# Patient Record
(demographics unavailable — no encounter records)

---

## 2025-05-21 NOTE — PHYSICAL EXAM
[Normal Coordination] : normal coordination [Normal Sensation] : normal sensation [Normal Mood and Affect] : normal mood and affect [Oriented] : oriented [Able to Communicate] : able to communicate [Well Developed] : well developed [Well Nourished] : well nourished [Left] : left shoulder [There are no fractures, subluxations or dislocations. No significant abnormalities are seen] : There are no fractures, subluxations or dislocations. No significant abnormalities are seen [Type 2 acromion] : Type 2 acromion [NL (0-180)] : full active abduction 0-180 degrees [NL (0-70)] : full internal rotation 0-70 degrees [NL (0-90)] : full external rotation 0-90 degrees [] : motor and sensory intact distally

## 2025-05-21 NOTE — HISTORY OF PRESENT ILLNESS
[de-identified] : Patient reports anterior shoulder pain with activity. She denies any injury but notes pain for quite a while. She occasionally has night pain and the worst pain with push-ups. She is RHD

## 2025-05-21 NOTE — DISCUSSION/SUMMARY
[de-identified] : We discussed formal PT, a home exercise program, ice therapy and the role of NSAIDS for the pain. These modalities will improve the patient's functionality in their activities of daily life.  Risks, benefits and contraindications were discussed.  The patient will follow up in 6 weeks or sooner as needed.

## 2025-05-21 NOTE — DISCUSSION/SUMMARY
[de-identified] : We discussed formal PT, a home exercise program, ice therapy and the role of NSAIDS for the pain. These modalities will improve the patient's functionality in their activities of daily life.  Risks, benefits and contraindications were discussed.  The patient will follow up in 6 weeks or sooner as needed.

## 2025-05-21 NOTE — HISTORY OF PRESENT ILLNESS
[de-identified] : Patient reports anterior shoulder pain with activity. She denies any injury but notes pain for quite a while. She occasionally has night pain and the worst pain with push-ups. She is RHD